# Patient Record
Sex: FEMALE | URBAN - METROPOLITAN AREA
[De-identification: names, ages, dates, MRNs, and addresses within clinical notes are randomized per-mention and may not be internally consistent; named-entity substitution may affect disease eponyms.]

---

## 2021-05-24 ENCOUNTER — APPOINTMENT (RX ONLY)
Dept: URBAN - METROPOLITAN AREA CLINIC 41 | Facility: CLINIC | Age: 41
Setting detail: DERMATOLOGY
End: 2021-05-24

## 2021-05-24 DIAGNOSIS — Z41.9 ENCOUNTER FOR PROCEDURE FOR PURPOSES OTHER THAN REMEDYING HEALTH STATE, UNSPECIFIED: ICD-10-CM

## 2021-05-24 PROCEDURE — ? VI PEEL

## 2021-05-24 ASSESSMENT — LOCATION SIMPLE DESCRIPTION DERM: LOCATION SIMPLE: LEFT CHEEK

## 2021-05-24 ASSESSMENT — LOCATION DETAILED DESCRIPTION DERM: LOCATION DETAILED: LEFT INFERIOR CENTRAL MALAR CHEEK

## 2021-05-24 ASSESSMENT — LOCATION ZONE DERM: LOCATION ZONE: FACE

## 2021-05-24 NOTE — PROCEDURE: VI PEEL
Consent: Prior to the procedure, written consent was obtained and risks were reviewed, including but not limited to: redness, peeling, blistering, pigmentary change, scarring, infection, and pain. Patient is aware multiple treatments may be necessary to achieve the desired outcome.  The standard VI Peel Consent form was reviewed and signed by patient.

## 2021-05-24 NOTE — PROCEDURE: VI PEEL
Price (Use Numbers Only, No Special Characters Or $): 033 Price (Use Numbers Only, No Special Characters Or $): 358

## 2021-05-24 NOTE — PROCEDURE: VI PEEL
Post Peel Care: After the procedure, the patient was instructed not to wash the treated area for 4 hours or manually remove dead skin when the peeling process starts. The patient was advised to only use their hands to cleanse the skin- no abrasive scrubs or instruments.  Tap the skin to dry, do not rub.  Removal of dead skin that is not naturally sloughing off increases risk of hyperpigmentation and scarring. The patient was given the VI Peel post care kit containing VI Derm towelettes, gentle cleanser, post treatment repair cream and spf.   Patient instructed to use the provided Retin-A wipes on the treated area on the 1st and 2nd nights.

## 2021-05-24 NOTE — PROCEDURE: VI PEEL
Prep: The treated area was cleansed with R Essentials Antioxidant Infused Gentle Foamy Cleanser, LHA Gel cleanser and then degreased with pre-peel pad. And 70% alcohol. Fan was turned on to help minimize odor discomfort.

## 2021-05-24 NOTE — PROCEDURE: VI PEEL
Post-Care Instructions: I reviewed with the patient in detail post-care instructions. \\nDownloaded and updated VI Peel Bernarda with patient and went over how to use the bernarda to know what steps to follow for the next 7 days. Reminded patient that for the  next 5 days, no sweating is recommended given the increased risk of blistering and hyperpigmentation.  In addition, the importance of sun protection was discussed- reapplication of sunscreen every 2 hours, use of broader rimmed hats, staying in shadowy areas, avoiding outdoor activities.

## 2021-05-24 NOTE — HPI: COSMETIC (CHEMICAL PEEL)
Have You Had A Chemical Peel Before?: has had a previous peel
When Outside In The Sun, Do You...: mostly tans, rarely burns
When Was Your Last Peel?: 4/26/2021

## 2021-05-24 NOTE — PROCEDURE: VI PEEL
Comments: The patient tolerated the treatment well.  Frosting was observed on jawline where break outs were. Today is the third treatment from her 3 txs series prepaid.\\n\\nVial used [id number] exp 1/23\\n\\nRecommended Brightening Pads 4% qhs during the summer. Comments: The patient tolerated the treatment well.  Frosting was observed on jawline where break outs were. Today is the third treatment from her 3 txs series prepaid.\\n\\nVial used [id number] exp 1/23\\n\\nRecommended Brightening Pads 4% qhs during the summer. stable